# Patient Record
Sex: MALE | Race: WHITE | NOT HISPANIC OR LATINO | Employment: OTHER | URBAN - METROPOLITAN AREA
[De-identification: names, ages, dates, MRNs, and addresses within clinical notes are randomized per-mention and may not be internally consistent; named-entity substitution may affect disease eponyms.]

---

## 2023-05-02 ENCOUNTER — OFFICE VISIT (OUTPATIENT)
Dept: PRIMARY CARE | Facility: CLINIC | Age: 88
End: 2023-05-02
Payer: MEDICARE

## 2023-05-02 VITALS
BODY MASS INDEX: 20.91 KG/M2 | WEIGHT: 141.2 LBS | HEIGHT: 69 IN | HEART RATE: 83 BPM | SYSTOLIC BLOOD PRESSURE: 117 MMHG | DIASTOLIC BLOOD PRESSURE: 66 MMHG | OXYGEN SATURATION: 92 %

## 2023-05-02 DIAGNOSIS — I10 HYPERTENSION, UNSPECIFIED TYPE: ICD-10-CM

## 2023-05-02 DIAGNOSIS — F41.9 ANXIETY: ICD-10-CM

## 2023-05-02 DIAGNOSIS — I48.91 ATRIAL FIBRILLATION, UNSPECIFIED TYPE (MULTI): ICD-10-CM

## 2023-05-02 DIAGNOSIS — Z00.00 WELLNESS EXAMINATION: Primary | ICD-10-CM

## 2023-05-02 PROCEDURE — 3078F DIAST BP <80 MM HG: CPT | Performed by: INTERNAL MEDICINE

## 2023-05-02 PROCEDURE — 1036F TOBACCO NON-USER: CPT | Performed by: INTERNAL MEDICINE

## 2023-05-02 PROCEDURE — 1170F FXNL STATUS ASSESSED: CPT | Performed by: INTERNAL MEDICINE

## 2023-05-02 PROCEDURE — 1160F RVW MEDS BY RX/DR IN RCRD: CPT | Performed by: INTERNAL MEDICINE

## 2023-05-02 PROCEDURE — G0444 DEPRESSION SCREEN ANNUAL: HCPCS | Performed by: INTERNAL MEDICINE

## 2023-05-02 PROCEDURE — 1159F MED LIST DOCD IN RCRD: CPT | Performed by: INTERNAL MEDICINE

## 2023-05-02 PROCEDURE — G0439 PPPS, SUBSEQ VISIT: HCPCS | Performed by: INTERNAL MEDICINE

## 2023-05-02 PROCEDURE — 3074F SYST BP LT 130 MM HG: CPT | Performed by: INTERNAL MEDICINE

## 2023-05-02 PROCEDURE — 99495 TRANSJ CARE MGMT MOD F2F 14D: CPT | Performed by: INTERNAL MEDICINE

## 2023-05-02 RX ORDER — MULTIVITAMIN
1 TABLET ORAL DAILY
COMMUNITY
Start: 2018-09-04

## 2023-05-02 RX ORDER — MIRTAZAPINE 7.5 MG/1
TABLET, FILM COATED ORAL NIGHTLY
COMMUNITY
Start: 2023-04-08

## 2023-05-02 RX ORDER — APIXABAN 2.5 MG/1
2.5 TABLET, FILM COATED ORAL 2 TIMES DAILY
Qty: 180 TABLET | Refills: 3 | Status: SHIPPED | OUTPATIENT
Start: 2023-05-02

## 2023-05-02 RX ORDER — ALBUTEROL SULFATE 0.83 MG/ML
SOLUTION RESPIRATORY (INHALATION)
COMMUNITY
Start: 2017-10-24

## 2023-05-02 RX ORDER — TORSEMIDE 20 MG/1
20 TABLET ORAL EVERY OTHER DAY
COMMUNITY
End: 2023-07-17 | Stop reason: SDUPTHER

## 2023-05-02 RX ORDER — FLUOXETINE HYDROCHLORIDE 20 MG/1
CAPSULE ORAL DAILY
COMMUNITY
Start: 2018-11-09

## 2023-05-02 RX ORDER — ALPRAZOLAM 0.25 MG/1
0.25 TABLET ORAL DAILY PRN
COMMUNITY
End: 2023-05-02 | Stop reason: SDUPTHER

## 2023-05-02 RX ORDER — CIPROFLOXACIN 250 MG/1
1 TABLET, FILM COATED ORAL EVERY 12 HOURS
COMMUNITY
Start: 2022-07-11

## 2023-05-02 RX ORDER — APIXABAN 2.5 MG/1
TABLET, FILM COATED ORAL 2 TIMES DAILY
COMMUNITY
Start: 2018-11-21 | End: 2023-05-02 | Stop reason: SDUPTHER

## 2023-05-02 RX ORDER — VIT C/E/ZN/COPPR/LUTEIN/ZEAXAN 250MG-90MG
CAPSULE ORAL
COMMUNITY
Start: 2015-07-14

## 2023-05-02 RX ORDER — LOSARTAN POTASSIUM 50 MG/1
TABLET ORAL DAILY
COMMUNITY
Start: 2018-08-10 | End: 2023-05-02 | Stop reason: ALTCHOICE

## 2023-05-02 RX ORDER — CARVEDILOL 25 MG/1
25 TABLET ORAL
COMMUNITY
Start: 2023-04-08

## 2023-05-02 RX ORDER — ALPRAZOLAM 0.25 MG/1
0.25 TABLET ORAL DAILY PRN
Qty: 30 TABLET | Refills: 0 | Status: SHIPPED | OUTPATIENT
Start: 2023-05-02

## 2023-05-02 RX ORDER — AMIODARONE HYDROCHLORIDE 100 MG/1
1 TABLET ORAL DAILY
COMMUNITY
Start: 2020-10-13

## 2023-05-02 RX ORDER — ASPIRIN 81 MG/1
81 TABLET ORAL
COMMUNITY

## 2023-05-02 RX ORDER — LOSARTAN POTASSIUM 25 MG/1
25 TABLET ORAL DAILY
Qty: 90 TABLET | Refills: 3 | Status: SHIPPED | OUTPATIENT
Start: 2023-05-02 | End: 2024-05-01

## 2023-05-02 ASSESSMENT — ENCOUNTER SYMPTOMS
DEPRESSION: 0
LOSS OF SENSATION IN FEET: 0
OCCASIONAL FEELINGS OF UNSTEADINESS: 1

## 2023-05-02 ASSESSMENT — ACTIVITIES OF DAILY LIVING (ADL)
DOING_HOUSEWORK: INDEPENDENT
TAKING_MEDICATION: INDEPENDENT
GROCERY_SHOPPING: INDEPENDENT
DRESSING: INDEPENDENT
BATHING: INDEPENDENT
MANAGING_FINANCES: INDEPENDENT

## 2023-05-02 ASSESSMENT — PATIENT HEALTH QUESTIONNAIRE - PHQ9
1. LITTLE INTEREST OR PLEASURE IN DOING THINGS: SEVERAL DAYS
2. FEELING DOWN, DEPRESSED OR HOPELESS: SEVERAL DAYS
SUM OF ALL RESPONSES TO PHQ9 QUESTIONS 1 AND 2: 2

## 2023-05-02 NOTE — PROGRESS NOTES
Subjective   Patient ID: Marito Nance is a 89 y.o. male who presents for the following    MEDICARE WELLNESS AND TRANSITIONS OF CARE     PHYSICAL TO BE DONE FOR 2023    Assessment/Plan   BONI vs ckd : resolved, but will recheck bmp while patient is on torsemide 20mg every other day. discussed with daughter.      a-fib: stable, continue on eliquis     CHF: EF 20% April 2023 stable, patient is euvolemic at this time. continue on losartan and torsemide     htn: stable,     MDS: stable, monitor cbc     anxiety/mild depression/insomnia: xanax as needed   urine drug screen     Patient is DNR [facility]    patient was recently seen in the hospital and patient's hospital record has been reviewed with the patient including, but not limited to, discharge summary, labs, imaging, consultation notes (if pertinent). Ambulatory medication list and discharge hospitalization list has been compared and updated for changes.    I spent 15 minutes obtaining and discussing depression screening using PHQ-9 questions and results documented in chart. And if patient's PHQ-9 score were elevated treatment plans will be put into place. patient is of sound mind upon leaving the office. I encouraged patient to remain social and to look into physical activities help with depression/stress, such as daily walking.        HPI  male with a-fib, chf, htn, MDS, third degree AV block here for    jose daughter present today     Hospitalized @[facility] 9-12, 2023 for CHF exacerbation. Patient did quite well and was placed back on torsemide every other day. Ace was held     BONI: Patient was taking torsemide alternating daily . Depends on volume status     CHF: Patient denies any chest pain, angina or exertional dyspnea. patient denies any swelling to lower extremities. Patient follows with cardiology on a regular basis. ef20%. patient follows with Dr therese carrion: patient denies any headaches, blurred vision or dizziness. patient denies any stroke like  symptoms. eliquis     MDS: patient does not follow with heme    anxiety/depression: e says that there are days when he needs to take it and days when he does not need to take it. He does have xanax and he does need it for panic. He at this time would like to have more     social: patient denies any smoking, drug or alcohol abuse (1/4 glass of red wine and pop)    OARRS:  No data recorded  I have personally reviewed the OARRS report for Marito Nance. I have considered the risks of abuse, dependence, addiction and diversion    Is the patient prescribed a combination of a benzodiazepine and opioid?  Yes, I feel it is clincially indicated to continue the medication and have discussed with the patient risks/benefits/alternatives.    Last Urine Drug Screen / ordered today: Yes  Recent Results (from the past 56289 hour(s))   OPIATE/OPIOID/BENZO PRESCRIPTION COMPLIANCE    Collection Time: 01/17/23  4:21 PM   Result Value Ref Range    DRUG SCREEN COMMENT URINE SEE BELOW     Creatine, Urine 125.4 mg/dL    Amphetamine Screen, Urine PRESUMPTIVE NEGATIVE NEGATIVE    Barbiturate Screen, Urine PRESUMPTIVE NEGATIVE NEGATIVE    Cannabinoid Screen, Urine PRESUMPTIVE NEGATIVE NEGATIVE    Cocaine Screen, Urine PRESUMPTIVE NEGATIVE NEGATIVE    PCP Screen, Urine PRESUMPTIVE NEGATIVE NEGATIVE    7-Aminoclonazepam <25 Cutoff <25 ng/mL    Alpha-Hydroxyalprazolam <25 Cutoff <25 ng/mL    Alpha-Hydroxymidazolam <25 Cutoff <25 ng/mL    Alprazolam <25 Cutoff <25 ng/mL    Chlordiazepoxide <25 Cutoff <25 ng/mL    Clonazepam <25 Cutoff <25 ng/mL    Diazepam <25 Cutoff <25 ng/mL    Lorazepam <25 Cutoff <25 ng/mL    Midazolam <25 Cutoff <25 ng/mL    Nordiazepam <25 Cutoff <25 ng/mL    Oxazepam <25 Cutoff <25 ng/mL    Temazepam <25 Cutoff <25 ng/mL    Zolpidem <25 Cutoff <25 ng/mL    Zolpidem Metabolite (ZCA) <25 Cutoff <25 ng/mL    6-Acetylmorphine <25 Cutoff <25 ng/mL    Codeine <50 Cutoff <50 ng/mL    Hydrocodone <25 Cutoff <25 ng/mL    Hydromorphone  "<25 Cutoff <25 ng/mL    Morphine Urine <50 Cutoff <50 ng/mL    Norhydrocodone <25 Cutoff <25 ng/mL    Noroxycodone <25 Cutoff <25 ng/mL    Oxycodone <25 Cutoff <25 ng/mL    Oxymorphone <25 Cutoff <25 ng/mL    Tramadol <50 Cutoff <50 ng/mL    O-Desmethyltramadol <50 Cutoff <50 ng/mL    Fentanyl <2.5 Cutoff<2.5 ng/mL    Norfentanyl <2.5 Cutoff<2.5 ng/mL    METHADONE CONFIRMATION,URINE <25 Cutoff <25 ng/mL    EDDP <25 Cutoff <25 ng/mL     Results are as expected.     Controlled Substance Agreement:  Date of the Last Agreement:    Reviewed Controlled Substance Agreement including but not limited to the benefits, risks, and alternatives to treatment with a Controlled Substance medication(s).    Benzodiazepines:  What is the patient's goal of therapy? Reduce anxiety  Is this being achieved with current treatment? stable    Activities of Daily Living:   Is your overall impression that this patient is benefiting (symptom reduction outweighs side effects) from benzodiazepine therapy? Yes     1. Physical Functioning: Better  2. Family Relationship: Better  3. Social Relationship: Better  4. Mood: Better  5. Sleep Patterns: Same  6. Overall Function: Better  Visit Vitals  /66   Pulse 83   Ht 1.753 m (5' 9\")   Wt 64 kg (141 lb 3.2 oz)   SpO2 92%   BMI 20.85 kg/m²   Smoking Status Former   BSA 1.77 m²     PHYSICAL EXAM     General appearance: Alert and in no acute distress. speech is clear and coherent  HEENT: Sclera and conjunctiva white, EOMI, uvela midline, no mouth lesions. PERRLA,    Respiratory : No respiratory distress, normal respiratory rhythm and effort. Clear bilateral breath sounds. No wheezing or rhonchi.   Cardiovascular: heart rate regular, S1, S2. no murmurs. no Lower extremity edema  Skin inspection: Normal skin color and pigmentation, normal skin turgor and no visible rash, induration, or cellulitis  MSK: 5/5 strength upper and lower extremities without gait abnormalities. no loss of muscle mass   Neuro: " 2-12 CN grossly intact.  no slurred speech. no lateralizing deficit  Psychiatric Orientation: Oriented to person, place, and time. no depression, homicidal or suicidal thoughts, normal affect  Abdomen: soft, none tender, none distended. no organomegaly    REVIEW OF SYSTEMS     Constitutional: not feeling tired and no fever, chills or sweats. Denies weight loss    HEENT: no earache and no sore throat. no blurred vision and or double vision. no headache  Cardiovascular: no exertional chest pain, no palpitations, no lower extremity edema and no intermittent leg claudication.   Lungs: Denies shortness of breath, exertional dyspnea, wheezing  Gastrointestinal: no change in bowel habits, no diarrhea, no nausea, no vomiting and no abdominal pain. Denies Melena, brbpr or dark stool  Musculoskeletal: no myalgias, no muscle weakness and no limb swelling.   Skin: no rashes, no change in skin color and pigmentation and no skin lumps.   Neurological: no headaches, no seizures, no numbness, no lateralizing deficits and no fainting.   Psychiatric: no depression and no anxiety.   Urine: denies polyuria, hematuria, dysuria   Endocrine: no cold intolerance, no heat intolerance    Allergies   Allergen Reactions    Ace Inhibitors Other and Swelling    Lisinopril Other    Paroxetine Other    Polyethylene Glycol 3350 Other       Current Outpatient Medications   Medication Sig Dispense Refill    ALPRAZolam (Xanax) 0.25 mg tablet Take 1 tablet (0.25 mg) by mouth once daily as needed.      aspirin 81 mg EC tablet Take 1 tablet (81 mg) by mouth once daily.      carvedilol (Coreg) 25 mg tablet 1 tablet (25 mg) 2 times a day with meals.      cholecalciferol (Vitamin D-3) 25 MCG (1000 UT) capsule Take by mouth.      FLUoxetine (PROzac) 20 mg capsule once daily.      losartan (Cozaar) 50 mg tablet once daily.      mirtazapine (Remeron) 7.5 mg tablet Take by mouth once daily at bedtime.      multivitamin tablet Take 1 tablet by mouth once daily.       torsemide (Demadex) 20 mg tablet Take 1 tablet (20 mg) by mouth every other day.      albuterol 2.5 mg /3 mL (0.083 %) nebulizer solution Inhale.      amiodarone (Pacerone) 100 mg tablet Take 1 tablet (100 mg) by mouth once daily.      ciprofloxacin (Cipro) 250 mg tablet Take 1 tablet (250 mg) by mouth every 12 hours.      Eliquis 2.5 mg tablet Take 1 tablet (2.5 mg) by mouth 2 times a day. 180 tablet 3     No current facility-administered medications for this visit.       Objective     Legacy Encounter on 01/17/2023   Component Date Value Ref Range Status    WBC 01/17/2023 7.2  4.4 - 11.3 x10E9/L Final    nRBC 01/17/2023 0.0  0.0 - 0.0 /100 WBC Final    RBC 01/17/2023 4.24 (L)  4.50 - 5.90 x10E12/L Final    Hemoglobin 01/17/2023 11.6 (L)  13.5 - 17.5 g/dL Final    Hematocrit 01/17/2023 39.5 (L)  41.0 - 52.0 % Final    MCV 01/17/2023 93  80 - 100 fL Final    MCHC 01/17/2023 29.4 (L)  32.0 - 36.0 g/dL Final    Platelets 01/17/2023 233  150 - 450 x10E9/L Final    RDW 01/17/2023 16.3 (H)  11.5 - 14.5 % Final    Glucose 01/17/2023 93  74 - 99 mg/dL Final    Sodium 01/17/2023 138  136 - 145 mmol/L Final    Potassium 01/17/2023 4.6  3.5 - 5.3 mmol/L Final    Chloride 01/17/2023 104  98 - 107 mmol/L Final    Bicarbonate 01/17/2023 27  21 - 32 mmol/L Final    Anion Gap 01/17/2023 12  10 - 20 mmol/L Final    Urea Nitrogen 01/17/2023 35 (H)  6 - 23 mg/dL Final    Creatinine 01/17/2023 1.55 (H)  0.50 - 1.30 mg/dL Final    GFR MALE 01/17/2023 40 (A)  >90 mL/min/1.73m2 Final    Calcium 01/17/2023 9.7  8.6 - 10.6 mg/dL Final    DRUG SCREEN COMMENT URINE 01/17/2023 SEE BELOW   Final    Creatine, Urine 01/17/2023 125.4  mg/dL Final    Amphetamine Screen, Urine 01/17/2023 PRESUMPTIVE NEGATIVE  NEGATIVE Final    Barbiturate Screen, Urine 01/17/2023 PRESUMPTIVE NEGATIVE  NEGATIVE Final    Cannabinoid Screen, Urine 01/17/2023 PRESUMPTIVE NEGATIVE  NEGATIVE Final    Cocaine Screen, Urine 01/17/2023 PRESUMPTIVE NEGATIVE  NEGATIVE  Final    PCP Screen, Urine 01/17/2023 PRESUMPTIVE NEGATIVE  NEGATIVE Final    7-Aminoclonazepam 01/17/2023 <25  Cutoff <25 ng/mL Final    Alpha-Hydroxyalprazolam 01/17/2023 <25  Cutoff <25 ng/mL Final    Alpha-Hydroxymidazolam 01/17/2023 <25  Cutoff <25 ng/mL Final    Alprazolam 01/17/2023 <25  Cutoff <25 ng/mL Final    Chlordiazepoxide 01/17/2023 <25  Cutoff <25 ng/mL Final    Clonazepam 01/17/2023 <25  Cutoff <25 ng/mL Final    Diazepam 01/17/2023 <25  Cutoff <25 ng/mL Final    Lorazepam 01/17/2023 <25  Cutoff <25 ng/mL Final    Midazolam 01/17/2023 <25  Cutoff <25 ng/mL Final    Nordiazepam 01/17/2023 <25  Cutoff <25 ng/mL Final    Oxazepam 01/17/2023 <25  Cutoff <25 ng/mL Final    Temazepam 01/17/2023 <25  Cutoff <25 ng/mL Final    Zolpidem 01/17/2023 <25  Cutoff <25 ng/mL Final    Zolpidem Metabolite (ZCA) 01/17/2023 <25  Cutoff <25 ng/mL Final    6-Acetylmorphine 01/17/2023 <25  Cutoff <25 ng/mL Final    Codeine 01/17/2023 <50  Cutoff <50 ng/mL Final    Hydrocodone 01/17/2023 <25  Cutoff <25 ng/mL Final    Hydromorphone 01/17/2023 <25  Cutoff <25 ng/mL Final    Morphine Urine 01/17/2023 <50  Cutoff <50 ng/mL Final    Norhydrocodone 01/17/2023 <25  Cutoff <25 ng/mL Final    Noroxycodone 01/17/2023 <25  Cutoff <25 ng/mL Final    Oxycodone 01/17/2023 <25  Cutoff <25 ng/mL Final    Oxymorphone 01/17/2023 <25  Cutoff <25 ng/mL Final    Tramadol 01/17/2023 <50  Cutoff <50 ng/mL Final    O-Desmethyltramadol 01/17/2023 <50  Cutoff <50 ng/mL Final    Fentanyl 01/17/2023 <2.5  Cutoff<2.5 ng/mL Final    Norfentanyl 01/17/2023 <2.5  Cutoff<2.5 ng/mL Final    METHADONE CONFIRMATION,URINE 01/17/2023 <25  Cutoff <25 ng/mL Final    EDDP 01/17/2023 <25  Cutoff <25 ng/mL Final       Radiology: Reviewed imaging in powerchart.  No results found.    No family history on file.  Social History     Socioeconomic History    Marital status:      Spouse name: None    Number of children: None    Years of education: None    Highest  education level: None   Occupational History    None   Tobacco Use    Smoking status: Former     Types: Cigarettes    Smokeless tobacco: Never   Vaping Use    Vaping status: None   Substance and Sexual Activity    Alcohol use: Yes    Drug use: None    Sexual activity: None   Other Topics Concern    None   Social History Narrative    None     Social Determinants of Health     Financial Resource Strain: Not on file   Food Insecurity: Not on file   Transportation Needs: Not on file   Physical Activity: Not on file   Stress: Not on file   Social Connections: Not on file   Intimate Partner Violence: Not on file   Housing Stability: Not on file     Past Medical History:   Diagnosis Date    Actinic keratosis 07/14/2015    Actinic keratoses    Adjustment disorder with depressed mood 07/07/2020    Grief reaction    Bicipital tendinitis, right shoulder 07/14/2015    Biceps tendinitis on right    Encounter for general adult medical examination without abnormal findings 07/07/2020    Medicare annual wellness visit, subsequent    Impacted cerumen, left ear 07/09/2021    Impacted cerumen of left ear    Impingement syndrome of left shoulder 07/14/2015    Impingement syndrome of left shoulder    Low back pain, unspecified 06/08/2018    Acute low back pain    Other specified anxiety disorders     Depression with anxiety    Pain in right hip 07/14/2015    Hip pain, bilateral    Personal history of diseases of the skin and subcutaneous tissue 10/16/2015    History of actinic keratosis    Personal history of other diseases of the nervous system and sense organs 04/25/2016    History of glaucoma    Presence of cardiac pacemaker     History of cardiac pacemaker     Past Surgical History:   Procedure Laterality Date    CARDIAC PACEMAKER PLACEMENT  09/04/2018    Pacemaker Placement    CATARACT EXTRACTION  09/04/2018    Cataract Extraction    COLONOSCOPY  09/04/2018    Complete Colonoscopy    OTHER SURGICAL HISTORY  09/04/2018    Needle  Biopsy Of Prostate    TONSILLECTOMY  07/14/2015    Tonsillectomy       Charting was completed using voice recognition technology and may include unintended errors.

## 2023-06-27 ENCOUNTER — PATIENT OUTREACH (OUTPATIENT)
Dept: PRIMARY CARE | Facility: CLINIC | Age: 88
End: 2023-06-27
Payer: MEDICARE

## 2023-07-17 ENCOUNTER — TELEPHONE (OUTPATIENT)
Dept: PRIMARY CARE | Facility: CLINIC | Age: 88
End: 2023-07-17
Payer: MEDICARE

## 2023-07-17 DIAGNOSIS — I48.91 ATRIAL FIBRILLATION, UNSPECIFIED TYPE (MULTI): ICD-10-CM

## 2023-07-17 RX ORDER — TORSEMIDE 20 MG/1
20 TABLET ORAL EVERY OTHER DAY
Qty: 45 TABLET | Refills: 3 | Status: SHIPPED | OUTPATIENT
Start: 2023-07-17 | End: 2024-07-16

## 2023-08-04 ENCOUNTER — OFFICE VISIT (OUTPATIENT)
Dept: PRIMARY CARE | Facility: CLINIC | Age: 88
End: 2023-08-04
Payer: MEDICARE

## 2023-08-04 VITALS
SYSTOLIC BLOOD PRESSURE: 103 MMHG | HEIGHT: 68 IN | BODY MASS INDEX: 21.79 KG/M2 | DIASTOLIC BLOOD PRESSURE: 66 MMHG | WEIGHT: 143.8 LBS | OXYGEN SATURATION: 97 % | HEART RATE: 65 BPM

## 2023-08-04 DIAGNOSIS — D64.9 ANEMIA, UNSPECIFIED TYPE: ICD-10-CM

## 2023-08-04 DIAGNOSIS — A49.9 BACTERIAL INFECTION: ICD-10-CM

## 2023-08-04 DIAGNOSIS — N39.0 URINARY TRACT INFECTION WITHOUT HEMATURIA, SITE UNSPECIFIED: ICD-10-CM

## 2023-08-04 DIAGNOSIS — Z00.00 ROUTINE GENERAL MEDICAL EXAMINATION AT A HEALTH CARE FACILITY: ICD-10-CM

## 2023-08-04 DIAGNOSIS — E03.9 HYPOTHYROIDISM, UNSPECIFIED TYPE: ICD-10-CM

## 2023-08-04 DIAGNOSIS — R05.9 COUGH, UNSPECIFIED TYPE: ICD-10-CM

## 2023-08-04 DIAGNOSIS — R73.02 IGT (IMPAIRED GLUCOSE TOLERANCE): ICD-10-CM

## 2023-08-04 PROCEDURE — 99214 OFFICE O/P EST MOD 30 MIN: CPT | Performed by: EMERGENCY MEDICINE

## 2023-08-04 PROCEDURE — 1159F MED LIST DOCD IN RCRD: CPT | Performed by: EMERGENCY MEDICINE

## 2023-08-04 PROCEDURE — 1160F RVW MEDS BY RX/DR IN RCRD: CPT | Performed by: EMERGENCY MEDICINE

## 2023-08-04 PROCEDURE — 1036F TOBACCO NON-USER: CPT | Performed by: EMERGENCY MEDICINE

## 2023-08-04 RX ORDER — LEVOFLOXACIN 500 MG/1
500 TABLET, FILM COATED ORAL DAILY
Qty: 7 TABLET | Refills: 0 | Status: SHIPPED | OUTPATIENT
Start: 2023-08-04 | End: 2023-08-11

## 2023-08-04 RX ORDER — AZITHROMYCIN 250 MG/1
250 TABLET, FILM COATED ORAL DAILY
Qty: 6 TABLET | Refills: 0 | Status: CANCELLED | OUTPATIENT
Start: 2023-08-04 | End: 2023-08-09

## 2023-08-04 NOTE — PROGRESS NOTES
Subjective   Patient ID: Marito Nance is a 89 y.o. male who presents for UTI and Cough.    Assessment/Plan   Problem List Items Addressed This Visit    None  Visit Diagnoses       Anemia, unspecified type        Relevant Orders    CBC and Auto Differential    Routine general medical examination at a health care facility        Relevant Orders    Comprehensive Metabolic Panel    IGT (impaired glucose tolerance)        Relevant Orders    Hemoglobin A1C    Hypothyroidism, unspecified type        Relevant Orders    TSH with reflex to Free T4 if abnormal    Bacterial infection        Cough, unspecified type        Urinary tract infection without hematuria, site unspecified        Relevant Orders    Uric Acid        Urinary frequency-check UA and treat with Levaquin empirically    Coughing-daughter states that this is consistent with CHF.  Patient has no edema or crackles on lung exam and is not short of breath  He is on torsemide every other day    Patient does not want to go to hospital or emergency room    Anticoagulated with Eliquis 2.5    Anxiety-on Xanax    A-fib-on amiodarone and Coreg    Anxiety and depression-takes Prozac and Remeron    Follow-up as needed    Source of history: Nurse, Medical personnel, Medical record, Patient.  History limitation: None.    HPI  89-year-old    Urinary frequency and some coughing  Was recently admitted for CHF    Allergies   Allergen Reactions    Ace Inhibitors Other and Swelling    Lisinopril Other    Paroxetine Other    Polyethylene Glycol 3350 Other       Current Outpatient Medications   Medication Sig Dispense Refill    ALPRAZolam (Xanax) 0.25 mg tablet Take 1 tablet (0.25 mg) by mouth once daily as needed for anxiety. 30 tablet 0    aspirin 81 mg EC tablet Take 1 tablet (81 mg) by mouth once daily.      carvedilol (Coreg) 25 mg tablet 1 tablet (25 mg) 2 times a day with meals.      cholecalciferol (Vitamin D-3) 25 MCG (1000 UT) capsule Take by mouth.      Eliquis 2.5 mg tablet  "Take 1 tablet (2.5 mg) by mouth 2 times a day. 180 tablet 3    FLUoxetine (PROzac) 20 mg capsule once daily.      losartan (Cozaar) 25 mg tablet Take 1 tablet (25 mg) by mouth once daily. 90 tablet 3    mirtazapine (Remeron) 7.5 mg tablet Take by mouth once daily at bedtime.      multivitamin tablet Take 1 tablet by mouth once daily.      torsemide (Demadex) 20 mg tablet Take 1 tablet (20 mg) by mouth every other day. 45 tablet 3    albuterol 2.5 mg /3 mL (0.083 %) nebulizer solution Inhale.      amiodarone (Pacerone) 100 mg tablet Take 1 tablet (100 mg) by mouth once daily.      ciprofloxacin (Cipro) 250 mg tablet Take 1 tablet (250 mg) by mouth every 12 hours.       No current facility-administered medications for this visit.       Objective   Visit Vitals  /66   Pulse 65   Ht 1.727 m (5' 8\")   Wt 65.2 kg (143 lb 12.8 oz)   SpO2 97%   BMI 21.86 kg/m²   Smoking Status Former   BSA 1.77 m²     Physical Exam  Vital signs as per nursing/MA documentation  General appearance: Alert and in no acute distress  HEENT: Normal Inspection  Neck - Normal Inspection  Respiratory : No respiratory distress. Lungs are clear   Cardiovascular: heart rate normal. No gallop  Back - normal inspection  Skin inspection:Warm  Musculoskeletal : No deformities  Neuro : Limited exam. Baseline    Review of Systems   Comprehensive review of systems as allowed by patient condition and nursing input is negative    Results including lab work, imaging reports were reviewed and wherever possible, independently verified    No family history on file.  Social History     Socioeconomic History    Marital status:      Spouse name: Not on file    Number of children: Not on file    Years of education: Not on file    Highest education level: Not on file   Occupational History    Not on file   Tobacco Use    Smoking status: Former     Types: Cigarettes    Smokeless tobacco: Never   Substance and Sexual Activity    Alcohol use: Yes    Drug use: " Not on file    Sexual activity: Not on file   Other Topics Concern    Not on file   Social History Narrative    Not on file     Social Determinants of Health     Financial Resource Strain: Not on file   Food Insecurity: Not on file   Transportation Needs: Not on file   Physical Activity: Not on file   Stress: Not on file   Social Connections: Not on file   Intimate Partner Violence: Not on file   Housing Stability: Not on file     Past Medical History:   Diagnosis Date    Actinic keratosis 07/14/2015    Actinic keratoses    Adjustment disorder with depressed mood 07/07/2020    Grief reaction    Bicipital tendinitis, right shoulder 07/14/2015    Biceps tendinitis on right    Encounter for general adult medical examination without abnormal findings 07/07/2020    Medicare annual wellness visit, subsequent    Impacted cerumen, left ear 07/09/2021    Impacted cerumen of left ear    Impingement syndrome of left shoulder 07/14/2015    Impingement syndrome of left shoulder    Low back pain, unspecified 06/08/2018    Acute low back pain    Other specified anxiety disorders     Depression with anxiety    Pain in right hip 07/14/2015    Hip pain, bilateral    Personal history of diseases of the skin and subcutaneous tissue 10/16/2015    History of actinic keratosis    Personal history of other diseases of the nervous system and sense organs 04/25/2016    History of glaucoma    Presence of cardiac pacemaker     History of cardiac pacemaker     Past Surgical History:   Procedure Laterality Date    CARDIAC PACEMAKER PLACEMENT  09/04/2018    Pacemaker Placement    CATARACT EXTRACTION  09/04/2018    Cataract Extraction    COLONOSCOPY  09/04/2018    Complete Colonoscopy    OTHER SURGICAL HISTORY  09/04/2018    Needle Biopsy Of Prostate    TONSILLECTOMY  07/14/2015    Tonsillectomy       Charting was completed using voice recognition technology and may include unintended errors.

## 2023-09-06 ENCOUNTER — TELEPHONE (OUTPATIENT)
Dept: PRIMARY CARE | Facility: CLINIC | Age: 88
End: 2023-09-06
Payer: MEDICARE

## 2023-09-06 NOTE — TELEPHONE ENCOUNTER
Pt.'s daughter states pt did not qualify for pallative care and would like a hospice referral  She states he is no longer eeating  Please advise

## 2023-09-11 ENCOUNTER — TELEPHONE (OUTPATIENT)
Dept: PRIMARY CARE | Facility: CLINIC | Age: 88
End: 2023-09-11
Payer: MEDICARE

## 2023-09-11 NOTE — TELEPHONE ENCOUNTER
Pt daughter requesting [facility] and has not received a call yet regarding referral and is uncomfortable.

## 2023-09-15 NOTE — TELEPHONE ENCOUNTER
I returned Abigail from [facility] and told her Dr Juarez referred pt to hospice and will sign death cert when it comes time but does not write hospice orders  She verbalized udnerstanding

## 2023-12-01 ENCOUNTER — TELEPHONE (OUTPATIENT)
Dept: PRIMARY CARE | Facility: CLINIC | Age: 88
End: 2023-12-01